# Patient Record
Sex: FEMALE | Race: BLACK OR AFRICAN AMERICAN | Employment: UNEMPLOYED | URBAN - METROPOLITAN AREA
[De-identification: names, ages, dates, MRNs, and addresses within clinical notes are randomized per-mention and may not be internally consistent; named-entity substitution may affect disease eponyms.]

---

## 2017-05-04 ENCOUNTER — HOSPITAL ENCOUNTER (EMERGENCY)
Age: 22
Discharge: HOME | End: 2017-05-04
Attending: EMERGENCY MEDICINE

## 2017-05-04 VITALS
OXYGEN SATURATION: 100 % | HEART RATE: 86 BPM | BODY MASS INDEX: 30.21 KG/M2 | TEMPERATURE: 98.4 F | SYSTOLIC BLOOD PRESSURE: 135 MMHG | DIASTOLIC BLOOD PRESSURE: 60 MMHG | RESPIRATION RATE: 16 BRPM | HEIGHT: 61 IN | WEIGHT: 160 LBS

## 2017-05-04 DIAGNOSIS — S90.829A BLISTER OF FOOT, UNSPECIFIED LATERALITY, INITIAL ENCOUNTER: Primary | ICD-10-CM

## 2017-05-04 DIAGNOSIS — Z86.59 H/O BIPOLAR DISORDER: ICD-10-CM

## 2017-05-04 PROCEDURE — 99283 EMERGENCY DEPT VISIT LOW MDM: CPT

## 2017-05-04 RX ORDER — BACITRACIN 500 [USP'U]/G
14 OINTMENT TOPICAL 3 TIMES DAILY
Qty: 1 TUBE | Refills: 0 | Status: SHIPPED | OUTPATIENT
Start: 2017-05-04 | End: 2017-05-14

## 2017-05-04 NOTE — DISCHARGE INSTRUCTIONS
Use triple antibiotic ointment for blisters     Bipolar Disorder: Care Instructions  Your Care Instructions  Bipolar disorder is an illness that causes extreme mood changes, from times of very high energy (manic episodes) to times of depression. But many people with bipolar disorder show only the symptoms of depression. These moods may cause problems with your work, school, family life, friendships, and how well you function. This disease is also called manic-depression. There is no cure for bipolar disorder, but it can be helped with medicines. Counseling may also help. It is important to take your medicines exactly as prescribed, even when you feel well. You may need lifelong treatment. Follow-up care is a key part of your treatment and safety. Be sure to make and go to all appointments, and call your doctor if you are having problems. It's also a good idea to know your test results and keep a list of the medicines you take. How can you care for yourself at home? · Be safe with medicines. Take your medicines exactly as prescribed. Do not stop or change a medicine without talking to your doctor first. Moira Rivera and your doctor may need to try different combinations of medicines to find what works for you. · Take your medicines on schedule to keep your moods even. When you feel good, you may think that you do not need your medicines. But it is important to keep taking them. · Go to your counseling sessions. Call and talk with your counselor if you can't go to a session or if you don't think the sessions are helping. Do not just stop going. · Get at least 30 minutes of activity on most days of the week. Walking is a good choice. You also may want to do other things, such as running, swimming, or cycling. · Get enough sleep. Keep your room dark and quiet. Try to go to bed at the same time every night. · Eat a healthy diet. This includes whole grains, dairy, fruits, vegetables, and protein.  Eat foods from each of these groups. · Try to lower your stress. Manage your time, build a strong support system, and lead a healthy lifestyle. To lower your stress, try physical activity, slow deep breathing, or getting a massage. · Do not use alcohol or illegal drugs. · Learn the early signs of your mood changes. You can then take steps to help yourself feel better. · Ask for help from friends and family when you need it. You may need help with daily chores when you are depressed. When you are manic, you may need support to control your high energy levels. What should you do if someone in your family has bipolar disorder? · Learn about the disease and the signs that it is getting worse. · Remind your family member that you love him or her. · Make a plan with all family members about how to take care of your loved one when his or her symptoms are bad. · Talk about your fears and concerns and those of other family members. Seek counseling if needed. · Do not focus attention only on the person who is in treatment. · Remind yourself that it will take time for changes to occur. · Do not blame yourself for the disease. · Know your legal rights and the legal rights of your family member. Support groups or counselors can help you with this information. · Take care of yourself. Keep up with your own interests, such as your career, hobbies, and friends. Use exercise, positive self-talk, deep breathing, and other relaxing exercises to help lower your stress. · Give yourself time to grieve. You may need to deal with emotions such as anger, fear, and frustration. After you work through your feelings, you will be better able to care for yourself and your family. · If you are having a hard time with your feelings or with your relationship with your family member, talk with a counselor. When should you call for help? Call 911 anytime you think you may need emergency care.  For example, call if:  · You feel like hurting yourself or someone else. · Someone who has bipolar disorder displays dangerous behavior, and you think the person might hurt himself or herself or someone else. Call your doctor now or seek immediate medical care if:  · You hear voices. · Someone you know has bipolar disorder and talks about suicide. Keep the numbers for these national suicide hotlines: 6-640-805-TALK (4-541.240.9372) and 8-888-RETOULB (4-908.284.4010). If a suicide threat seems real, with a specific plan and a way to carry it out, stay with the person, or ask someone you trust to stay with the person, until you can get help. · Someone you know has bipolar disorder and:  ¨ Starts to give away possessions. ¨ Is using illegal drugs or drinking alcohol heavily. ¨ Talks or writes about death, including writing suicide notes or talking about guns, knives, or pills. ¨ Talks or writes about hurting someone else. ¨ Starts to spend a lot of time alone. ¨ Acts very aggressively or suddenly appears calm. ¨ Talks about beliefs that are not based in reality (delusions). Watch closely for changes in your health, and be sure to contact your doctor if:  · You cannot go to your counseling sessions. Where can you learn more? Go to http://sathya-sugey.info/. Enter K052 in the search box to learn more about \"Bipolar Disorder: Care Instructions. \"  Current as of: July 26, 2016  Content Version: 11.2  © 3795-3890 [employer]. Care instructions adapted under license by [employer] (which disclaims liability or warranty for this information). If you have questions about a medical condition or this instruction, always ask your healthcare professional. [place] [address] any warranty or liability for your use of this information.

## 2017-05-04 NOTE — ED NOTES
Hourly Rounding:  Pt sleeping, RR equal and non-labored, side rails up x 2, bed in lowest position, call bell within reach, no needs at this time.

## 2017-05-04 NOTE — ED NOTES
Pt arrived via ems with c/o bilateral foot pain r/t \"walking everywhere. \"  Pt ambulated into ER and down hallway with steady gait in NAD. Pt states she is homeless and requests \"medicine for my lupus and bipolar\" while in the ER. Pt denies SI/HI, depression.

## 2017-05-04 NOTE — ED NOTES
I have reviewed discharge instructions with the patient. The patient verbalized understanding. Patient armband removed and shredded  Patient refused Rx, states \"I can't get that filled anyway. \"  Patient provided with non-skid socks.

## 2017-05-04 NOTE — ED NOTES
Hourly Rounding:  Pt sleeping, lying on right lateral, RR equal and non-labored, side rails up x 2, bed in lowest position, call bell within reach, no needs at this time.

## 2017-05-04 NOTE — ED PROVIDER NOTES
HPI Comments: 3:03 AM   Candido Cuevas is a 23 y.o. Female with Hx of bipolar disorder and Lupus presenting to the ED via EMS  C/O bilateral foot pain onset two weeks ago. Per EMS, pt is homeless. Pt notes that she has been walking \"everywhere\" and that worsened her pain. Pt admits to occasional tobacco use but denies EtOH and illicit drug use. FMHx HTN. Pt denies previous trauma and any other Sx or complaints. Patient is a 23 y.o. female presenting with foot pain. The history is provided by the patient. No  was used. Foot Pain    This is a new problem. The current episode started more than 1 week ago (2 weeks ago). The problem occurs constantly. The problem has not changed since onset. The pain is present in the left foot and right foot. The pain is at a severity of 10/10. There has been no history of extremity trauma. Written by CRIS Marquez, as dictated by Ariadna Melendez. Rogelio Rivas MD      History reviewed. No pertinent past medical history. History reviewed. No pertinent surgical history. History reviewed. No pertinent family history. Social History     Social History    Marital status: SINGLE     Spouse name: N/A    Number of children: N/A    Years of education: N/A     Occupational History    Not on file. Social History Main Topics    Smoking status: Not on file    Smokeless tobacco: Not on file    Alcohol use Not on file    Drug use: Not on file    Sexual activity: Not on file     Other Topics Concern    Not on file     Social History Narrative    No narrative on file         ALLERGIES: Review of patient's allergies indicates no known allergies. Review of Systems   Musculoskeletal: Positive for arthralgias (Bilateral foot pain) and myalgias. Skin: Wound: Bilateral foot pain. All other systems reviewed and are negative.       Vitals:    05/04/17 0153   BP: 135/60   Pulse: 86   Resp: 16   Temp: 98.4 °F (36.9 °C)   SpO2: 100%   Weight: 72.6 kg (160 lb)   Height: 5' 1\" (1.549 m)            Physical Exam   Constitutional: She is oriented to person, place, and time. She appears well-developed and well-nourished. She is sleeping. No distress. Sleepy but eventually arousable. Disheveled appearance   HENT:   Head: Normocephalic and atraumatic. Right Ear: External ear normal.   Left Ear: External ear normal.   Mouth/Throat: Oropharynx is clear and moist. No oropharyngeal exudate. Eyes: Conjunctivae and EOM are normal. Pupils are equal, round, and reactive to light. No scleral icterus. No pallor   Neck: Normal range of motion. Neck supple. No JVD present. No tracheal deviation present. No thyromegaly present. Cardiovascular: Normal rate, regular rhythm and normal heart sounds. Pulmonary/Chest: Effort normal and breath sounds normal. No stridor. No respiratory distress. Abdominal: Soft. Bowel sounds are normal. She exhibits no distension. There is no tenderness. There is no rebound and no guarding. Musculoskeletal: Normal range of motion. She exhibits no edema or tenderness. No soft tissue injuries   Lymphadenopathy:     She has no cervical adenopathy. Neurological: She is oriented to person, place, and time. She has normal reflexes. No cranial nerve deficit. Coordination normal.   Skin: Skin is warm and dry. She is not diaphoretic. Multiple small blisters to the feet beneath the first MTP and the heel of the right foot   Psychiatric: She has a normal mood and affect. Her behavior is normal. Judgment and thought content normal.   Nursing note and vitals reviewed. RESULTS:    PULSE OXIMETRY NOTE:  Pulse-ox is 100% on RA  Interpretation: Normal       No orders to display        Labs Reviewed - No data to display    No results found for this or any previous visit (from the past 12 hour(s)).       MDM  Number of Diagnoses or Management Options  Blister of foot, unspecified laterality, initial encounter:   H/O bipolar disorder:   Diagnosis management comments: DDx: Minor foot blisters. She is homeless and came in for treatment of blisters. I suspect that she needed escape from environment. No other emergency medical condition seems evident    ED Course   Medications - No data to display     Procedures  PROGRESS NOTE:  3:03 AM  Initial assessment performed. Written by Aquilino Caicedo, ED Scribe, as dictated by Tanner Sánchez. Gonzalo Cordova MD     DISCHARGE NOTE:  4:08 AM   Derik Grewal's  results have been reviewed with her. She has been counseled regarding her diagnosis, treatment, and plan. She verbally conveys understanding and agreement of the signs, symptoms, diagnosis, treatment and prognosis and additionally agrees to follow up as discussed. She also agrees with the care-plan and conveys that all of her questions have been answered. I have also provided discharge instructions for her that include: educational information regarding their diagnosis and treatment, and list of reasons why they would want to return to the ED prior to their follow-up appointment, should her condition change. The patient and/or family has been provided with education for proper Emergency Department utilization. CLINICAL IMPRESSION:    1. Blister of foot, unspecified laterality, initial encounter    2.  H/O bipolar disorder        PLAN: DISCHARGE HOME    Follow-up Information     Follow up With Details Comments [address] ([place]) Schedule an appointment as soon as possible for a visit in 1 day Follow up with [facility]  [address] [place] [address]      [id number] [place] [address] Schedule an appointment as soon as possible for a visit in 2 days Follow up with your primary care physician [id number] [facility], [address] [facility] [address] [facility] [address]    [facility] Go to As needed, If symptoms worsen [address] [place] [id number]  [phone]          Current Discharge Medication List      START taking these medications    Details   bacitracin (BACITRACIN) 500 unit/gram oint Apply 14 g to affected area three (3) times daily for 10 days. Apply to affected area  Qty: 1 Tube, Refills: 0             ATTESTATIONS:  This note is prepared by Héctor Montaño, acting as Scribe for Clovis Hare. Javier Fisher MD .    Clovis Hare. Javier Fisher MD : The scribe's documentation has been prepared under my direction and personally reviewed by me in its entirety. I confirm that the note above accurately reflects all work, treatment, procedures, and medical decision making performed by me.

## 2017-06-14 ENCOUNTER — HOSPITAL ENCOUNTER (EMERGENCY)
Age: 22
Discharge: HOME | End: 2017-06-15
Attending: EMERGENCY MEDICINE

## 2017-06-14 ENCOUNTER — APPOINTMENT (OUTPATIENT)
Dept: GENERAL RADIOLOGY | Age: 22
End: 2017-06-14
Attending: PHYSICIAN ASSISTANT

## 2017-06-14 VITALS
HEART RATE: 84 BPM | DIASTOLIC BLOOD PRESSURE: 69 MMHG | OXYGEN SATURATION: 100 % | HEIGHT: 61 IN | RESPIRATION RATE: 18 BRPM | WEIGHT: 148 LBS | BODY MASS INDEX: 27.94 KG/M2 | SYSTOLIC BLOOD PRESSURE: 113 MMHG | TEMPERATURE: 98.8 F

## 2017-06-14 DIAGNOSIS — M25.552 LEFT HIP PAIN: ICD-10-CM

## 2017-06-14 DIAGNOSIS — M79.671 PAIN IN BOTH FEET: Primary | ICD-10-CM

## 2017-06-14 DIAGNOSIS — R23.8 SKIN IRRITATION: ICD-10-CM

## 2017-06-14 DIAGNOSIS — M79.672 PAIN IN BOTH FEET: Primary | ICD-10-CM

## 2017-06-14 LAB
ALBUMIN SERPL BCP-MCNC: 3.7 G/DL (ref 3.4–5)
ALBUMIN/GLOB SERPL: 0.9 {RATIO} (ref 0.8–1.7)
ALP SERPL-CCNC: 71 U/L (ref 45–117)
ALT SERPL-CCNC: 16 U/L (ref 13–56)
ANION GAP BLD CALC-SCNC: 10 MMOL/L (ref 3–18)
AST SERPL W P-5'-P-CCNC: 18 U/L (ref 15–37)
BASOPHILS # BLD AUTO: 0 K/UL (ref 0–0.06)
BASOPHILS # BLD: 0 % (ref 0–2)
BILIRUB SERPL-MCNC: 0.3 MG/DL (ref 0.2–1)
BUN SERPL-MCNC: 11 MG/DL (ref 7–18)
BUN/CREAT SERPL: 14 (ref 12–20)
CALCIUM SERPL-MCNC: 8.7 MG/DL (ref 8.5–10.1)
CHLORIDE SERPL-SCNC: 105 MMOL/L (ref 100–108)
CO2 SERPL-SCNC: 27 MMOL/L (ref 21–32)
CREAT SERPL-MCNC: 0.77 MG/DL (ref 0.6–1.3)
DIFFERENTIAL METHOD BLD: NORMAL
EOSINOPHIL # BLD: 0.2 K/UL (ref 0–0.4)
EOSINOPHIL NFR BLD: 4 % (ref 0–5)
ERYTHROCYTE [DISTWIDTH] IN BLOOD BY AUTOMATED COUNT: 12.9 % (ref 11.6–14.5)
GLOBULIN SER CALC-MCNC: 4 G/DL (ref 2–4)
GLUCOSE SERPL-MCNC: 84 MG/DL (ref 74–99)
HCT VFR BLD AUTO: 38 % (ref 35–45)
HGB BLD-MCNC: 12.6 G/DL (ref 12–16)
LYMPHOCYTES # BLD AUTO: 36 % (ref 21–52)
LYMPHOCYTES # BLD: 2.5 K/UL (ref 0.9–3.6)
MCH RBC QN AUTO: 29.2 PG (ref 24–34)
MCHC RBC AUTO-ENTMCNC: 33.2 G/DL (ref 31–37)
MCV RBC AUTO: 88.2 FL (ref 74–97)
MONOCYTES # BLD: 0.6 K/UL (ref 0.05–1.2)
MONOCYTES NFR BLD AUTO: 9 % (ref 3–10)
NEUTS SEG # BLD: 3.6 K/UL (ref 1.8–8)
NEUTS SEG NFR BLD AUTO: 51 % (ref 40–73)
PLATELET # BLD AUTO: 266 K/UL (ref 135–420)
PMV BLD AUTO: 9.6 FL (ref 9.2–11.8)
POTASSIUM SERPL-SCNC: 4 MMOL/L (ref 3.5–5.5)
PROT SERPL-MCNC: 7.7 G/DL (ref 6.4–8.2)
RBC # BLD AUTO: 4.31 M/UL (ref 4.2–5.3)
SODIUM SERPL-SCNC: 142 MMOL/L (ref 136–145)
WBC # BLD AUTO: 6.9 K/UL (ref 4.6–13.2)

## 2017-06-14 PROCEDURE — 99282 EMERGENCY DEPT VISIT SF MDM: CPT

## 2017-06-14 PROCEDURE — 73502 X-RAY EXAM HIP UNI 2-3 VIEWS: CPT

## 2017-06-14 PROCEDURE — 80053 COMPREHEN METABOLIC PANEL: CPT | Performed by: PHYSICIAN ASSISTANT

## 2017-06-14 PROCEDURE — 85025 COMPLETE CBC W/AUTO DIFF WBC: CPT | Performed by: PHYSICIAN ASSISTANT

## 2017-06-15 NOTE — DISCHARGE INSTRUCTIONS
Musculoskeletal Pain: Care Instructions  Your Care Instructions  Different problems with the bones, muscles, nerves, ligaments, and tendons in the body can cause pain. One or more areas of your body may ache or burn. Or they may feel tired, stiff, or sore. The medical term for this type of pain is musculoskeletal pain. It can have many different causes. Sometimes the pain is caused by an injury such as a strain or sprain. Or you might have pain from using one part of your body in the same way over and over again. This is called overuse. In some cases, the cause of the pain is another health problem such as arthritis or fibromyalgia. The doctor will examine you and ask you questions about your health to help find the cause of your pain. Blood tests or imaging tests like an X-ray may also be helpful. But sometimes doctors can't find a cause of the pain. Treatment depends on your symptoms and the cause of the pain, if known. The doctor has checked you carefully, but problems can develop later. If you notice any problems or new symptoms, get medical treatment right away. Follow-up care is a key part of your treatment and safety. Be sure to make and go to all appointments, and call your doctor if you are having problems. It's also a good idea to know your test results and keep a list of the medicines you take. How can you care for yourself at home? · Rest until you feel better. · Do not do anything that makes the pain worse. Return to exercise gradually if you feel better and your doctor says it's okay. · Be safe with medicines. Read and follow all instructions on the label. ¨ If the doctor gave you a prescription medicine for pain, take it as prescribed. ¨ If you are not taking a prescription pain medicine, ask your doctor if you can take an over-the-counter medicine. · Put ice or a cold pack on the area for 10 to 20 minutes at a time to ease pain. Put a thin cloth between the ice and your skin.   When should you call for help? Call your doctor now or seek immediate medical care if:  · You have new pain, or your pain gets worse. · You have new symptoms such as a fever, a rash, or chills. Watch closely for changes in your health, and be sure to contact your doctor if:  · You do not get better as expected. Where can you learn more? Go to http://sathya-sugey.info/. Enter Y919 in the search box to learn more about \"Musculoskeletal Pain: Care Instructions. \"  Current as of: October 14, 2016  Content Version: 11.2  © 5111-4933 [employer]. Care instructions adapted under license by [employer] (which disclaims liability or warranty for this information). If you have questions about a medical condition or this instruction, always ask your healthcare professional. [address] any warranty or liability for your use of this information.

## 2017-06-15 NOTE — ED NOTES
Pt presents to the ER with complaints of RA pain. Pt states she has pain in her left hip, and bilateral ankles and swelling in her ankles. Pt states she went to get her medications filled today but she could not afford it. Pt also states she is homeless and has been walking a lot more than normal lately. Pt also states she is bipolar and \"I have seizures sometimes at night but my doctor thinks that its panic attacks. \" Pt states she does not take any medications for bipolar or seizures.

## 2017-06-15 NOTE — ED TRIAGE NOTES
Pain in left shoulder and pain and swelling in both feet, pt is currently homeless, states she has RA, currently bipolar, not taking any medication for RA at this time, state she could not afford it

## 2017-06-15 NOTE — ED PROVIDER NOTES
HPI Comments: 10:24 PM  Daniel Morrow is a 23 y.o. homeless female with PMHx of RA, lupus, and bipolar disorder presenting to the ED C/O left hip pain worse today. Her pain feels similar to her typical RA pain. She reports a fall 2-3 weeks ago onto the left hip; the hip pain was present prior to the fall, but has been worse since the fall. She has been ambulatory since the fall. Pt is currently not taking pain medications for RA 2/2 cost. She takes Meloxicam for pain when she can afford it. Pt also C/O swelling to bilateral feet/ankles, which pt attributes to walking a lot recently. No other injury. She also also C/O skin irritation and pain near her shoulder areas from carrying her bag. Pt is on Depo. Pt also reports \"I have a h/o seizures at night which they think are panic attacks. \" Pt denies calf swelling, chest pain, SOB, recent long distance travel, recent surgery, recent immobilizations, h/o blood clots or CA, and any other symptoms or complaints at this time. Patient is a 23 y.o. female presenting with other event. The history is provided by the patient. No  was used. Other   This is a new problem. Pertinent negatives include no chest pain and no shortness of breath. Past Medical History:   Diagnosis Date    Bipolar 1 disorder ([facility]     Lupus (HCC)     Panic attacks     Rheumatoid arthritis ([facility]        Past Surgical History:   Procedure Laterality Date    HX ORTHOPAEDIC      ankle surgery, right    HX TONSILLECTOMY      HX WISDOM TEETH EXTRACTION           History reviewed. No pertinent family history. Social History     Social History    Marital status: SINGLE     Spouse name: N/A    Number of children: N/A    Years of education: N/A     Occupational History    Not on file.      Social History Main Topics    Smoking status: Current Every Day Smoker     Packs/day: 0.25    Smokeless tobacco: Not on file    Alcohol use Yes    Drug use: Not on file    Sexual activity: Not on file     Other Topics Concern    Not on file     Social History Narrative         ALLERGIES: Review of patient's allergies indicates no known allergies. Review of Systems   Respiratory: Negative for shortness of breath. Cardiovascular: Negative for chest pain. Musculoskeletal: Positive for arthralgias (left hip) and joint swelling (bilateral feet). Skin:        +irritation and pain to skin near shoulders   Allergic/Immunologic: Positive for immunocompromised state. All other systems reviewed and are negative. Vitals:    06/14/17 2225   BP: 113/69   Pulse: 84   Resp: 18   Temp: 98.8 °F (37.1 °C)   SpO2: 100%   Weight: 67.1 kg (148 lb)   Height: 5' 1\" (1.549 m)            Physical Exam   Constitutional: She is oriented to person, place, and time. She appears well-developed and well-nourished. No distress. Pt appears well sitting up in bed in no distress, speaking in full sentences without evidence of dyspnea, increased work of breathing or any obvious pain at this time     HENT:   Head: Normocephalic and atraumatic. Neck: Normal range of motion. Neck supple. Cardiovascular: Normal rate, regular rhythm, normal heart sounds and intact distal pulses. No murmur heard. Pulmonary/Chest: Effort normal and breath sounds normal. No respiratory distress. She has no wheezes. She has no rales. She exhibits no tenderness. Abdominal: Soft. Bowel sounds are normal. She exhibits no distension. There is no tenderness. There is no rebound and no guarding. Musculoskeletal:        Left hip: She exhibits tenderness. She exhibits normal range of motion, normal strength, no swelling, no crepitus and no deformity. Back non tender, FROM, able to ambulate w/o difficulty   BLE:   No obvious leg swelling, pulses 2+ for DP and PT, N/V intact, FROM    Neurological: She is alert and oriented to person, place, and time. Skin: Skin is warm and dry. No rash noted. No erythema. No pallor.    Skin clear, no blisters, abrasions, erythema, rashes to the shoulders or upper back   Psychiatric: She has a normal mood and affect. Judgment normal.   Nursing note and vitals reviewed. RESULTS:    PULSE OXIMETRY NOTE:  Pulse-ox is 100% on room air  Interpretation: normal       XR HIP LT W OR WO PELV 2-3 VWS    (Results Pending)      RADIOLOGY RESULT  11:56 PM   Left hip X-ray shows no acute process. Pending review by radiologist.   Written by CRIS Marie, as dictated by Ruth Morataya PA-C. Labs Reviewed   CBC WITH AUTOMATED DIFF   METABOLIC PANEL, COMPREHENSIVE       Recent Results (from the past 12 hour(s))   CBC WITH AUTOMATED DIFF    Collection Time: 06/14/17 10:45 PM   Result Value Ref Range    WBC 6.9 4.6 - 13.2 K/uL    RBC 4.31 4.20 - 5.30 M/uL    HGB 12.6 12.0 - 16.0 g/dL    HCT 38.0 35.0 - 45.0 %    MCV 88.2 74.0 - 97.0 FL    MCH 29.2 24.0 - 34.0 PG    MCHC 33.2 31.0 - 37.0 g/dL    RDW 12.9 11.6 - 14.5 %    PLATELET 123 203 - 926 K/uL    MPV 9.6 9.2 - 11.8 FL    NEUTROPHILS 51 40 - 73 %    LYMPHOCYTES 36 21 - 52 %    MONOCYTES 9 3 - 10 %    EOSINOPHILS 4 0 - 5 %    BASOPHILS 0 0 - 2 %    ABS. NEUTROPHILS 3.6 1.8 - 8.0 K/UL    ABS. LYMPHOCYTES 2.5 0.9 - 3.6 K/UL    ABS. MONOCYTES 0.6 0.05 - 1.2 K/UL    ABS. EOSINOPHILS 0.2 0.0 - 0.4 K/UL    ABS. BASOPHILS 0.0 0.0 - 0.06 K/UL    DF AUTOMATED     METABOLIC PANEL, COMPREHENSIVE    Collection Time: 06/14/17 10:45 PM   Result Value Ref Range    Sodium 142 136 - 145 mmol/L    Potassium 4.0 3.5 - 5.5 mmol/L    Chloride 105 100 - 108 mmol/L    CO2 27 21 - 32 mmol/L    Anion gap 10 3.0 - 18 mmol/L    Glucose 84 74 - 99 mg/dL    BUN 11 7.0 - 18 MG/DL    Creatinine 0.77 0.6 - 1.3 MG/DL    BUN/Creatinine ratio 14 12 - 20      GFR est AA >60 >60 ml/min/1.73m2    GFR est non-AA >60 >60 ml/min/1.73m2    Calcium 8.7 8.5 - 10.1 MG/DL    Bilirubin, total 0.3 0.2 - 1.0 MG/DL    ALT (SGPT) 16 13 - 56 U/L    AST (SGOT) 18 15 - 37 U/L    Alk.  phosphatase 71 45 - 117 U/L    Protein, total 7.7 6.4 - 8.2 g/dL    Albumin 3.7 3.4 - 5.0 g/dL    Globulin 4.0 2.0 - 4.0 g/dL    A-G Ratio 0.9 0.8 - 1.7         MDM  Number of Diagnoses or Management Options  Left hip pain:   Pain in both feet:   Skin irritation:      Amount and/or Complexity of Data Reviewed  Clinical lab tests: ordered and reviewed  Tests in the radiology section of CPT®: ordered and reviewed (XR left hip)  Discuss the patient with other providers: yes Shannon Ford MD (ED attending))  Independent visualization of images, tracings, or specimens: yes (XR left hip)      ED Course     MEDICATIONS GIVEN:  Medications - No data to display    Procedures    PROGRESS NOTE:  10:23 PM  Initial assessment performed. Recorded by Ellen Diaz, ED Scribe, as dictated by Ruth Morataya PA-C.    DISCUSSION:  Non toxic, NAD, no increased work of breathing, vitals stable, labs normal. Pt c/o bilateral foot pain from extended periods of walking w/o calf pain or tenderness. No chest pain or SOB. C/o left hip pain after fall x 2 weeks ago. Able to ambulate w/o difficulty, no deformity or obvious injury. C/o \"skin irritation\" over the shoulders due to the straps of her bag, no skin changes. No bony tenderness. sts she is supposed to be taking Meloxicam but cannot afford it. 12:07 AM Discussed patient's history, exam, and available diagnostics results with Becca Zelaya. Melida Ford MD (ED attending), who agrees with plan. Discharge Note:  12:18 AM   Janet Grewal's results have been reviewed with her and/or her family. She has been counseled regarding her diagnosis, treatment, and plan. She verbally conveys understanding and agreement of the signs, symptoms, diagnosis, treatment and prognosis and additionally agrees to follow up as discussed. She also agrees with the care-plan and conveys that all of her questions have been answered.  I have also provided discharge instructions for her that include: educational information regarding the diagnosis and treatment, and a list of reasons why She would want to return to the ED prior to her follow-up appointment, should her condition change. Proper ED utilization discussed with the patient. CLINICAL IMPRESSION    1. Pain in both feet    2. Left hip pain    3. Skin irritation        After visit plan:  D/c home    Discharge Medication List as of 6/15/2017 12:17 AM          Follow-up Information     Follow up With Details Comments Contact Info    [facility] Call  [id number] [facility], [address] [place] [address] [address]    [facility]  As needed, If symptoms worsen [address] [id number]  [phone]          This note is prepared by Rupinder Enciso, acting as Scribe for Rico Newsome PA-C. Rico Newsome PA-C: The scribe's documentation has been prepared under my direction and personally reviewed by me in its entirety. I confirm that the note above accurately reflects all work, treatment, procedures, and medical decision making performed by me.